# Patient Record
Sex: MALE | Race: WHITE | NOT HISPANIC OR LATINO | URBAN - METROPOLITAN AREA
[De-identification: names, ages, dates, MRNs, and addresses within clinical notes are randomized per-mention and may not be internally consistent; named-entity substitution may affect disease eponyms.]

---

## 2024-06-13 ENCOUNTER — TELEPHONE (OUTPATIENT)
Dept: PSYCHIATRY | Facility: CLINIC | Age: 19
End: 2024-06-13

## 2024-06-17 ENCOUNTER — TELEPHONE (OUTPATIENT)
Dept: PSYCHIATRY | Facility: CLINIC | Age: 19
End: 2024-06-17

## 2024-06-17 NOTE — BH TREATMENT PLAN
TREATMENT PLAN (Medication Management Only)        [facility]    Name and Date of Birth:  Marino ARMENDARIZ May 19 y.o. 2005  Date of Treatment Plan: June 21, 2024  Diagnosis/Diagnoses: Anxiety  Strengths/Personal Resources for Self-Care: ability to adapt to life changes, ability to communicate needs, ability to communicate well, ability to listen, ability to reason, ability to understand psychiatric illness, average or above intelligence.  Area/Areas of need (in own words): anxiety symptoms  1. Long Term Goal: improve control of anxiety.  Target Date: 12/21/2024  Person/Persons responsible for completion of goal: Marino  2.  Short Term Objective (s) - How will we reach this goal?:   A. Provider new recommended medication/dosage changes and/or continue medication(s): continue current medications as prescribed.  B. Keep regularly scheduled psychiatric appointments  C. Maintain adherence to psychotropic medication regimen   D. Exercise daily (at least 30 mins)  E. Maintain appropriate dietary intake  F. Practice adequate sleep hygiene    Target Date: 12/21/2024  Person/Persons Responsible for Completion of Goal: Marino  Progress Towards Goals: initiating treatment  Treatment Modality: medication management every 4 weeks  Review due 180 days from date of this plan:  12/21/2024  Expected length of service: ongoing treatment  My Physician/PA/NP and I have developed this plan together and I agree to work on the goals and objectives. I understand the treatment goals that were developed for my treatment.

## 2024-06-17 NOTE — TELEPHONE ENCOUNTER
"Behavioral Health Outpatient Intake Questions    Referred By   : SELF    Please advise interviewee that they need to answer all questions truthfully to allow for best care, and any misrepresentations of information may affect their ability to be seen at this clinic   => Was this discussed? Yes     If Minor Child (under age 18)    Who is/are the legal guardian(s) of the child?     Is there a custody agreement? No     If \"YES\"- Custody orders must be obtained prior to scheduling the first appointment  In addition, Consent to Treatment must be signed by all legal guardians prior to scheduling the first appointment    If \"NO\"- Consent to Treatment must be signed by all legal guardians prior to scheduling the first appointment    Behavioral Health Outpatient Intake History -     Presenting Problem (in patient's own words): Anxiety    Are there any communication barriers for this patient?     No                                               If yes, please describe barriers:   If there is a unique situation, please refer to Eugene Nava/Chuyita Mederos for final determination.    Are you taking any psychiatric medications? No     If \"YES\" -What are they     If \"YES\" -Who prescribes?     Has the Patient previously received outpatient Talk Therapy or Medication Management from [facility]  Yes        If \"YES\"- When, Where and with Whom? When he was younger was seen at .        If \"NO\" -Has Patient received these services elsewhere?       If \"YES\" -When, Where, and with Whom?    Has the Patient abused alcohol or other substances in the last 6 months ? No       If \"YES\" -What substance, How much, How often?     If illegal substance: Refer to [facility] (for VA[facility]NAVI) or [facility].   If Alcohol in excess of 10 drinks per week:  Refer to [facility] (for [facility]) or [facility]    Legal History-     Is this treatment court ordered? No   If \"yes \"send to :  Talk Therapy : Send to Eugene Nava/Chuyita Mederos for final " "determination   Med Management: Send to Dr Mejia for final determination     Has the Patient been convicted of a felony?  No   If \"Yes\" send to -When, What?  Talk Therapy : Send to Eugene Nava/Chuyita Mederos for final determination   Med Management: Send to Dr Mejia for final determination     ACCEPTED as a patient   If \"Yes\" Appointment Date: 4/21/24    Referred Elsewhere? No  If “Yes” - (Where? Ex: [facility], [facility], [facility], [facility], etc.)     Name of Insurance Co:  H chip, [employer]  Insurance ID#  [id number]   Insurance Phone #  If ins is primary or secondary?  If patient is a minor, parents information such as Name, D.O.B of guarantor.  "
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
## 2024-06-20 RX ORDER — LEVOTHYROXINE SODIUM 137 UG/1
137 TABLET ORAL DAILY
COMMUNITY

## 2024-06-20 RX ORDER — FLUTICASONE PROPIONATE 50 MCG
SPRAY, SUSPENSION (ML) NASAL
COMMUNITY
Start: 2024-05-06

## 2024-06-20 RX ORDER — HYDROXYZINE HYDROCHLORIDE 25 MG/1
25 TABLET, FILM COATED ORAL
COMMUNITY
Start: 2024-01-22 | End: 2024-06-21

## 2024-06-20 RX ORDER — LANOLIN ALCOHOL/MO/W.PET/CERES
3 CREAM (GRAM) TOPICAL
COMMUNITY

## 2024-06-20 RX ORDER — ADAPALENE GEL USP, 0.3% 3 MG/G
GEL TOPICAL
COMMUNITY
Start: 2024-04-08

## 2024-06-20 RX ORDER — OSELTAMIVIR PHOSPHATE 75 MG/1
75 CAPSULE ORAL 2 TIMES DAILY
COMMUNITY
Start: 2024-01-16

## 2024-06-20 RX ORDER — SERTRALINE HYDROCHLORIDE 100 MG/1
TABLET, FILM COATED ORAL
COMMUNITY

## 2024-06-20 RX ORDER — LANOLIN ALCOHOL/MO/W.PET/CERES
CREAM (GRAM) TOPICAL
COMMUNITY
Start: 2024-05-04

## 2024-06-21 ENCOUNTER — OFFICE VISIT (OUTPATIENT)
Dept: PSYCHIATRY | Facility: CLINIC | Age: 19
End: 2024-06-21
Payer: COMMERCIAL

## 2024-06-21 DIAGNOSIS — F41.1 GAD (GENERALIZED ANXIETY DISORDER): Primary | ICD-10-CM

## 2024-06-21 PROBLEM — J45.998 ASTHMA, PERSISTENT CONTROLLED: Status: ACTIVE | Noted: 2024-01-16

## 2024-06-21 PROBLEM — F51.01 PRIMARY INSOMNIA: Status: ACTIVE | Noted: 2024-01-16

## 2024-06-21 PROBLEM — R05.1 ACUTE COUGH: Status: ACTIVE | Noted: 2024-06-21

## 2024-06-21 PROBLEM — E55.9 VITAMIN D DEFICIENCY: Status: ACTIVE | Noted: 2018-08-16

## 2024-06-21 PROBLEM — R07.9 CHEST PAIN: Status: ACTIVE | Noted: 2024-06-21

## 2024-06-21 PROBLEM — J45.998 OTHER ASTHMA: Status: ACTIVE | Noted: 2024-06-21

## 2024-06-21 PROBLEM — F07.81 POST CONCUSSION SYNDROME: Status: ACTIVE | Noted: 2024-01-16

## 2024-06-21 PROBLEM — Z20.828 CONTACT WITH AND (SUSPECTED) EXPOSURE TO OTHER VIRAL COMMUNICABLE DISEASES: Status: ACTIVE | Noted: 2024-06-21

## 2024-06-21 PROBLEM — Z11.1 ENCOUNTER FOR SCREENING FOR RESPIRATORY TUBERCULOSIS: Status: ACTIVE | Noted: 2024-06-21

## 2024-06-21 PROBLEM — E66.09 OTHER OBESITY DUE TO EXCESS CALORIES: Status: ACTIVE | Noted: 2024-01-16

## 2024-06-21 PROBLEM — R53.83 OTHER FATIGUE: Status: ACTIVE | Noted: 2024-06-21

## 2024-06-21 PROBLEM — M62.838 OTHER MUSCLE SPASM: Status: ACTIVE | Noted: 2024-06-21

## 2024-06-21 PROBLEM — M25.50 PAIN IN UNSPECIFIED JOINT: Status: ACTIVE | Noted: 2024-06-21

## 2024-06-21 PROBLEM — R20.2 PARESTHESIA OF SKIN: Status: ACTIVE | Noted: 2024-06-21

## 2024-06-21 PROBLEM — H52.219: Status: ACTIVE | Noted: 2024-01-16

## 2024-06-21 PROBLEM — J02.9 ACUTE PHARYNGITIS, UNSPECIFIED: Status: ACTIVE | Noted: 2024-06-21

## 2024-06-21 PROBLEM — R21 RASH AND OTHER NONSPECIFIC SKIN ERUPTION: Status: ACTIVE | Noted: 2024-06-21

## 2024-06-21 PROBLEM — D80.1 LOW GAMMAGLOBULIN LEVEL (HCC): Status: ACTIVE | Noted: 2024-01-16

## 2024-06-21 PROBLEM — F90.0 ATTENTION DEFICIT HYPERACTIVITY DISORDER (ADHD), PREDOMINANTLY INATTENTIVE TYPE: Status: ACTIVE | Noted: 2024-01-16

## 2024-06-21 PROBLEM — G43.119 MIGRAINE WITH AURA, INTRACTABLE, WITHOUT STATUS MIGRAINOSUS: Status: ACTIVE | Noted: 2024-01-16

## 2024-06-21 PROBLEM — R03.0 ELEVATED BLOOD-PRESSURE READING, WITHOUT DIAGNOSIS OF HYPERTENSION: Status: ACTIVE | Noted: 2024-06-21

## 2024-06-21 PROBLEM — R29.0 TETANY: Status: ACTIVE | Noted: 2024-01-16

## 2024-06-21 PROBLEM — F93.0 SEPARATION ANXIETY DISORDER OF CHILDHOOD: Status: ACTIVE | Noted: 2024-01-16

## 2024-06-21 PROBLEM — F95.9 TIC DISORDER: Status: ACTIVE | Noted: 2024-01-16

## 2024-06-21 PROCEDURE — 90792 PSYCH DIAG EVAL W/MED SRVCS: CPT

## 2024-06-21 RX ORDER — HYDROXYZINE HYDROCHLORIDE 25 MG/1
25 TABLET, FILM COATED ORAL EVERY 6 HOURS PRN
Qty: 120 TABLET | Refills: 0 | Status: SHIPPED | OUTPATIENT
Start: 2024-06-21

## 2024-06-21 NOTE — BH CRISIS PLAN
Client Name: Marino Lord       Client YOB: 2005    [place] Safety Plan      Creation Date: 6/21/24    Created By: MAGEN Sifuentes       Step 1: Warning Signs:   Warning Signs   shakiness   Dryt mouth            Step 2: Internal Coping Strategies:   Internal Coping Strategies   work out   watch tic gretel   deep breaths            Step 3: People and social settings that provide distraction:   Name Contact Information   friends in cell   sister in cell            Step 4: People whom I can ask for help during a crisis:      Name Contact Information    friends in cell    sister in cell      Step 5: Professionals or agencies I can contact during a crisis:      [facility]/Agency Name Phone Emergency Contact    [facility]  [phone]      Local Emergency Department Emergency Department Phone Emergency Department Address    911          Crisis Phone Numbers:   Suicide Prevention Lifeline: Call or Text  409 Crisis Text Line: Text HOME to 927-034   Please note: Some [facility] do not have a separate number for Child/Adolescent specific crisis. If your county is not listed under Child/Adolescent, please call the adult number for your county      Adult Crisis Numbers: Child/Adolescent Crisis Numbers   [facility]: [phone] [facility]: [phone]   [facility]: [phone] [facility]: [phone]   [facility]: [phone] [place]: [phone]   [facility]: [phone] [facility]: [phone]   [facility]: [phone]   [facility]: [phone]   [facility]: [phone]   [facility]: [phone] (daytime) [phone] (after hours, weekends, holidays)      Step 6: Making the environment safer (plan for lethal means safety):      Optional: What is most important to me and worth living for?      [place] Safety Plan. Lissette Mendoza and Ray Baxter. Used with permission of the authors.
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
## 2024-07-09 ENCOUNTER — TELEPHONE (OUTPATIENT)
Dept: PSYCHIATRY | Facility: CLINIC | Age: 19
End: 2024-07-09

## 2024-07-15 ENCOUNTER — OFFICE VISIT (OUTPATIENT)
Dept: PSYCHIATRY | Facility: CLINIC | Age: 19
End: 2024-07-15
Payer: COMMERCIAL

## 2024-07-15 DIAGNOSIS — F41.1 GAD (GENERALIZED ANXIETY DISORDER): Primary | ICD-10-CM

## 2024-07-15 PROCEDURE — 99212 OFFICE O/P EST SF 10 MIN: CPT

## 2024-07-15 RX ORDER — ESCITALOPRAM OXALATE 5 MG/1
5 TABLET ORAL DAILY
Qty: 30 TABLET | Refills: 0 | Status: SHIPPED | OUTPATIENT
Start: 2024-07-15

## 2024-07-21 PROBLEM — J02.9 ACUTE PHARYNGITIS, UNSPECIFIED: Status: RESOLVED | Noted: 2024-06-21 | Resolved: 2024-07-21

## 2024-07-21 PROBLEM — Z11.1 ENCOUNTER FOR SCREENING FOR RESPIRATORY TUBERCULOSIS: Status: RESOLVED | Noted: 2024-06-21 | Resolved: 2024-07-21

## 2024-07-21 PROBLEM — R05.1 ACUTE COUGH: Status: RESOLVED | Noted: 2024-06-21 | Resolved: 2024-07-21

## 2024-08-06 DIAGNOSIS — F41.1 GAD (GENERALIZED ANXIETY DISORDER): ICD-10-CM

## 2024-08-06 RX ORDER — ESCITALOPRAM OXALATE 5 MG/1
5 TABLET ORAL DAILY
Qty: 90 TABLET | Refills: 0 | Status: SHIPPED | OUTPATIENT
Start: 2024-08-06

## 2024-11-29 ENCOUNTER — TELEPHONE (OUTPATIENT)
Age: 19
End: 2024-11-29

## 2024-11-29 NOTE — TELEPHONE ENCOUNTER
Outreach call placed in an attempt to schedule pt from Talk Therapy wait list. Pt declines interest in services at this time.     Removed from wait list.

## 2025-03-27 ENCOUNTER — DOCUMENTATION (OUTPATIENT)
Dept: PSYCHIATRY | Facility: CLINIC | Age: 20
End: 2025-03-27

## 2025-03-27 NOTE — PSYCH
Psychiatric Discharge Summary     Initial Evaluation: 6/21/2024  Last visit  Date: 7/15/2024    Discharge Diagnosis: ISABELLE    Treating Physician: MAGEN Wu      Presenting Problems/Pertinent Findings: anxiety- started Lexapro 5 mg         Course of Treatment:  Patient was in treatment with this provider 2 visits.        Subsequently, the patient withdrew from treatment.    Criteria for Discharge: Withdrew from Treatment    Aftercare Recommendations: Follow up with pcp    Discharge Medications:   Current Outpatient Medications:     Cholecalciferol (Vitamin D3) 125 MCG (5000 UT) CAPS, take 1 capsule by mouth every day for 90 days, Disp: , Rfl:     escitalopram (LEXAPRO) 5 mg tablet, TAKE 1 TABLET (5 MG TOTAL) BY MOUTH DAILY., Disp: 90 tablet, Rfl: 0    hydrOXYzine HCL (ATARAX) 25 mg tablet, Take 1 tablet (25 mg total) by mouth every 6 (six) hours as needed for anxiety, Disp: 120 tablet, Rfl: 0    levothyroxine 137 mcg tablet, Take 137 mcg by mouth daily, Disp: , Rfl:     melatonin 3 mg, Take 3 mg by mouth, Disp: , Rfl:     vitamin B-12 (VITAMIN B-12) 1,000 mcg tablet, TAKE 1 TABLET BY MOUTH EVERY DAY AS ONE DOSE, Disp: , Rfl:        Mental Status at Time of most recent visit on 7/15/2024-stable.

## 2025-03-28 ENCOUNTER — TELEPHONE (OUTPATIENT)
Dept: PSYCHIATRY | Facility: CLINIC | Age: 20
End: 2025-03-28